# Patient Record
Sex: MALE | NOT HISPANIC OR LATINO | Employment: FULL TIME | URBAN - METROPOLITAN AREA
[De-identification: names, ages, dates, MRNs, and addresses within clinical notes are randomized per-mention and may not be internally consistent; named-entity substitution may affect disease eponyms.]

---

## 2024-05-23 ENCOUNTER — MEDICAL CORRESPONDENCE (OUTPATIENT)
Dept: HEALTH INFORMATION MANAGEMENT | Facility: CLINIC | Age: 54
End: 2024-05-23
Payer: COMMERCIAL

## 2024-05-23 ENCOUNTER — TRANSCRIBE ORDERS (OUTPATIENT)
Dept: OTHER | Age: 54
End: 2024-05-23

## 2024-05-23 DIAGNOSIS — H83.09 LABYRINTHITIS, UNSPECIFIED LATERALITY: Primary | ICD-10-CM

## 2024-05-23 DIAGNOSIS — R42 VERTIGO: ICD-10-CM

## 2024-05-24 ENCOUNTER — TELEPHONE (OUTPATIENT)
Dept: OTOLARYNGOLOGY | Facility: CLINIC | Age: 54
End: 2024-05-24
Payer: COMMERCIAL

## 2024-05-24 NOTE — TELEPHONE ENCOUNTER
Patient confirmed scheduled appointment:  Date: 7/25  Time: 2  Provider: SIDNEY  Location: [facility]   Testing/imaging: WIN and VNG.   Additional notes: itinerary sent .

## 2024-05-24 NOTE — TELEPHONE ENCOUNTER
[facility] Call Center    Phone Message    May a detailed message be left on voicemail: yes     Reason for Call: Appointment Intake    Referring Provider Name: Referred by  Dr Rick Lee Nissen [facility]  Diagnosis and/or Symptoms:     V/O per Dr Nissen-for dizziness and possible benign positional vertigo.       Action Taken: Message routed to:  [facility] ENT    Travel Screening: Not Applicable

## 2024-07-18 ENCOUNTER — TELEPHONE (OUTPATIENT)
Dept: AUDIOLOGY | Facility: CLINIC | Age: 54
End: 2024-07-18
Payer: COMMERCIAL

## 2024-07-18 NOTE — TELEPHONE ENCOUNTER
"\"I m calling from the Audiology and Balance Testing department at the . This is just a call to remind you of your upcoming Balance Testing appointment on [Date], and to see if you have any questions or concerns regarding the balance testing you'll be doing. You should have received an itinerary via mail or via [employer], if you are active, that goes over what to expect and explains the dos and don ts both 48 hours before, and the day of. There is a list of medications for you to review on the itinerary that we would like you to stop taking beforehand. If you didn t receive the itinerary or you still have questions, please give our clinic a call at [phone]. Otherwise, we will see you on [Date] starting at [Time].\"    Please send encounter if patient would like to reschedule.    "

## 2024-07-24 NOTE — PROGRESS NOTES
"AUDIOLOGY REPORT-BALANCE ASSESSMENT    SUBJECTIVE: Kel Self, 53 year old, was seen in Audiology at the [facility] on 7/25/2024, for videonystagmography (VNG) referred by Rick Nissen, M.D.     Patient presents with chief complaints of dizziness/vertigo. Patient reports symptoms onset in January 2024. Patient reports experiencing a left ear infection and was treated by antibiotics at this time. Patient reports he does not recall left ear pain but his ear did feel plugged and his ear was reportedly \"red and inflamed\" and fluid was noted when he was evaluated medically. Patient describes initial onset as experiencing bad headaches 1-2 weeks prior followed by standing up quickly and experiencing a \"flash\" of dizziness a few hours before sitting down in a chair at home. While the patient was sitting down at home, patient experienced sudden room spinning dizziness which lasted approximately one day in duration and prompted him to seek emergency medical care. Patient was later seen by ENT (Dr. Rick Nissen) and labyrinthitis and BPPV were suspected. Patient reports he underwent evaluation with a vestibular physical therapist but did not experience any relief in symptoms. He was then sent here for further evaluation.    Since initial onset of symptoms, patient reports persistent episodic brief room spinning dizziness whenever he lies on his back and looks up, rolls to his left, or turns his head or body to quickly to the left. Patient reports closing his eyes and moving out of the provoking position will resolve his spinning symptoms. Between his episodes, patient reports feeling a general sense of being \"off.\" Patient reports he sometimes drifts a bit while driving but does not veer or sway while walking. Patient reports that he may stumble if he is walking and turns his head quickly but he is able to catch himself. He denies any falls to date.     Patient's most recent " "hearing evaluation performed earlier today revealed normal hearing bilaterally. Patient reports his left ear felt achy a few weeks ago. He reports constant left ear plugged sensation. He denies ear pain, drainage, tinnitus and history of previous ear surgeries.    Patient reports watching TV or scrolling on his phone can provoke a headache but does not provoke dizziness. Patient reports history of a few headaches every week which last one hour up to many hours in duration. Patient describes his headaches as being localized through his eyes. Headaches can sometimes present with visual aura (\"dot(s) moving across visual field\"). Patient denies dizziness with his headaches. Patient reports some sensitivity to bright lights or loud sounds. He denies dizziness provoked by loud sounds. Patient denies motion intolerance and history of previous head injuries or concussions. Patient denies dizziness provoked by coughing, sneezing, blowing his nose, lifting heavy items or bearing down. Patient denies autophony (eye blinks). Patient reports sometimes experiencing persistent motion post cessation of motion; notable post walking. Patient denies sensation of motion while still (rocking or swaying). Patient reports intermittent blurred vision with his headaches. He denies double vision. He reports previous history of bilateral LASIK eye surgery. He reports recent history of a wire falling into his left eye requiring a temporary contact which was removed yesterday. Patient denies history of cancer or chemotherapy. Family history is positive for vertigo (sister; resolved with physical therapy, and daughter; still gets intermittently). Family history is negative for hearing loss and migraines. Patient denies consumption of caffeinated beverages, nicotine, alcoholic substances, or use of medications with known vestibular interactions within the past 48 hours.    OBJECTIVE:  Abuse Screening:  Do you feel unsafe at home or work/school? " No  Do you feel threatened by someone? No  Does anyone try to keep you from having contact with others, or doing things outside of your home? No  Physical signs of abuse present? No    Videonystagmography (VNG) testing:  Prescreening:  Tympanograms: Normal eardrum mobility bilaterally. Note: this test is completed to determine the status of the middle ear before irrigations are completed. *Patient denies dizziness with tympanometry. Please see audiogram for tympanograms.   Ocular range of motion and ocular counter roll: Normal  Cross/cover: Normal  Head Thrust: Positive to the left, Negative to the right.     Nystagmus Tests:  Gaze-Horizontal with Fixation:   Center: Normal   Right: Normal   Left: Normal  Gaze-Vertical with Fixation:   Up: Normal   Down: Normal  Gaze with Fixation Denied   Center: Normal   Right: Normal   Left: Normal   Up: Normal  High Frequency Headshake:   Horizontal: Positive.  12 degrees/s right beating nystagmus, no symptoms.   Vertical: Positive.  3 degrees/s left beating nystagmus, no symptoms.    [place]-Hallpike Head Right: Negative for PC BPPV. No consistent nystagmus, no symptoms.  [place] Head Left: Positive for PC BPPV.  Fatiguing geotropic (left and up) torsional nystagmus with corresponding fatiguing spinning dizziness (fatigued over 35 seconds), reversal with corresponding symptoms noted upon rising to seated position.  Roll Test Head Right: Negative for HC BPPV. No consistent nystagmus, no symptoms.  Roll Test Head Left: Negative for HC BPPV. No consistent nystagmus, no symptoms.  Additional Positional: Left Epley progression 2x.      Positional Testing:  Positionals: Supine: 4 degrees/s right beating nystagmus, suppresses with fixation, no symptoms.  Positionals: Body Right: No consistent nystagmus, no symptoms.  Positionals: Body Left: 4 degrees/s right beating nystagmus, suppresses with fixation, no symptoms.  Positionals: Pre-Caloric: 3 degrees/s right beating nystagmus,  suppresses with fixation, no symptoms.    Oculomotor Tests:  Saccades (repeatable): Abnormal  Anti-saccades: Normal, patient can complete task.  Pursuit: Normal    Calorics:  (Tested at 44 degrees and 30 degrees Celsius for 30 seconds for warm and cool water, respectively):  Right Warm Eye Speed (corrected): 61 degrees per second right beating  Left Warm Eye Speed (corrected): 9 degrees per second left beating  Right Cool Eye Speed (corrected): 25 degrees per second left beating  Left Cool Eye Speed (corrected): 6 degrees per second right beating  Difference between ear: 70% left hypofunction. (Greater than 25% considered clinically significant.)  *Corrected for 3 degrees/s right beating nystagmus noted in Pre-Caloric Positional.  Fixation Index: Normal  Overall caloric test: Abnormal    Post Irrigations Otoscopy: Normal    ASSESSMENT:  1. Indications of mild central vestibular system involvement noted on today's exam were as follows:   -Abnormal Saccade testing (repeatable).     2. Indications of peripheral vestibular system involvement noted on today's exam were as follows:   -Positive for Left PC BPPV (posterior canal benign paroxysmal positional vertigo); treated 2x.  -Mild right beating nystagmus, suppresses with fixation, evident in Supine, Pre-Caloric and Body Left Positionals.  -Positive Left Head Thrust.  -Positive High Frequency Headshakes.  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      PLAN:  Patient would benefit from referral to vestibular physical therapy pending review by referring provider. Follow-up with Dr. Rick Nissen regarding today's results and for medical management. Please call this clinic at [phone] with questions regarding these results or recommendations.       Jose Alejandro Harris. CCC-A  Licensed Audiologist   [place] #[id number]

## 2024-07-25 ENCOUNTER — OFFICE VISIT (OUTPATIENT)
Dept: AUDIOLOGY | Facility: CLINIC | Age: 54
End: 2024-07-25
Attending: OTOLARYNGOLOGY
Payer: COMMERCIAL

## 2024-07-25 DIAGNOSIS — Z01.10 HEARING WITHIN NORMAL LIMITS IN BOTH EARS: ICD-10-CM

## 2024-07-25 DIAGNOSIS — R42 VERTIGO: ICD-10-CM

## 2024-07-25 DIAGNOSIS — H83.2X2 VESTIBULAR HYPOFUNCTION OF LEFT EAR: ICD-10-CM

## 2024-07-25 DIAGNOSIS — R42 DIZZINESS: ICD-10-CM

## 2024-07-25 DIAGNOSIS — H93.8X9 EAR PRESSURE: Primary | ICD-10-CM

## 2024-07-25 DIAGNOSIS — H81.12 BPPV (BENIGN PAROXYSMAL POSITIONAL VERTIGO), LEFT: Primary | ICD-10-CM

## 2024-07-25 PROCEDURE — 92550 TYMPANOMETRY & REFLEX THRESH: CPT | Performed by: AUDIOLOGIST

## 2024-07-25 PROCEDURE — 92545 OSCILLATING TRACKING TEST: CPT | Mod: XU | Performed by: AUDIOLOGIST

## 2024-07-25 PROCEDURE — 92557 COMPREHENSIVE HEARING TEST: CPT | Performed by: AUDIOLOGIST

## 2024-07-25 PROCEDURE — 92537 CALORIC VSTBLR TEST W/REC: CPT | Performed by: AUDIOLOGIST

## 2024-07-25 PROCEDURE — 92541 SPONTANEOUS NYSTAGMUS TEST: CPT | Performed by: AUDIOLOGIST

## 2024-07-25 PROCEDURE — 92542 POSITIONAL NYSTAGMUS TEST: CPT | Mod: XU | Performed by: AUDIOLOGIST

## 2024-07-25 NOTE — PROGRESS NOTES
AUDIOLOGY REPORT    SUMMARY: Audiology visit completed. See audiogram for results.      RECOMMENDATIONS: Proceed with Balance testing. Follow-up with Dr. Rick Nissen.    Jose Alejandro Harris. CCC-A  Licensed Audiologist   [place] #[id number]

## 2024-09-09 ENCOUNTER — TELEPHONE (OUTPATIENT)
Dept: AUDIOLOGY | Facility: CLINIC | Age: 54
End: 2024-09-09
Payer: COMMERCIAL

## 2024-09-09 NOTE — TELEPHONE ENCOUNTER
PLAN:  Patient would benefit from referral to vestibular physical therapy pending review by referring provider. Follow-up with Dr. Rick Nissen regarding today's results and for medical management. Please call this clinic at [phone] with questions regarding these results or recommendations.     Per Kelsie DUNHAM in audiology. Called pt and let him know he needs to follow up with Dr Nissen in [place]. Results were sent him and he would need to place the referral if needed. Pt was ok with plan and reaching out to him in [place].  Neena Hemphill LPN

## 2024-09-09 NOTE — TELEPHONE ENCOUNTER
[facility] Call Center    Phone Message    May a detailed message be left on voicemail: yes     Reason for Call: Other: Pt calling in regards to his recent balance testing. States he was a told a ref for PT was going to be placed to [facility] in [place]. Please advise. Thanks.     Action Taken: Other: AUDIO    Travel Screening: Not Applicable     Date of Service: